# Patient Record
Sex: FEMALE | Race: OTHER | HISPANIC OR LATINO | Employment: UNEMPLOYED | URBAN - NONMETROPOLITAN AREA
[De-identification: names, ages, dates, MRNs, and addresses within clinical notes are randomized per-mention and may not be internally consistent; named-entity substitution may affect disease eponyms.]

---

## 2023-01-20 ENCOUNTER — OFFICE VISIT (OUTPATIENT)
Dept: FAMILY MEDICINE | Facility: CLINIC | Age: 23
End: 2023-01-20
Payer: MEDICAID

## 2023-01-20 VITALS
TEMPERATURE: 99 F | BODY MASS INDEX: 33.79 KG/M2 | HEART RATE: 87 BPM | DIASTOLIC BLOOD PRESSURE: 82 MMHG | OXYGEN SATURATION: 99 % | WEIGHT: 202.81 LBS | SYSTOLIC BLOOD PRESSURE: 122 MMHG | RESPIRATION RATE: 18 BRPM | HEIGHT: 65 IN

## 2023-01-20 DIAGNOSIS — F32.A DEPRESSION, UNSPECIFIED DEPRESSION TYPE: ICD-10-CM

## 2023-01-20 DIAGNOSIS — Z72.51 HIGH RISK HETEROSEXUAL BEHAVIOR: ICD-10-CM

## 2023-01-20 DIAGNOSIS — Z20.2 EXPOSURE TO STD: Primary | ICD-10-CM

## 2023-01-20 DIAGNOSIS — N89.8 VAGINAL ITCHING: ICD-10-CM

## 2023-01-20 LAB
B-HCG UR QL: NEGATIVE
BACTERIA VAG QL WET PREP: ABNORMAL /HPF
BILIRUB SERPL-MCNC: NEGATIVE MG/DL
BLOOD URINE, POC: ABNORMAL
CLUE CELLS VAG QL WET PREP: ABNORMAL /HPF
COLOR, POC UA: YELLOW
CTP QC/QA: YES
GLUCOSE UR QL STRIP: NEGATIVE
HIV 1+O+2 AB SERPL QL: NORMAL
KETONES UR QL STRIP: NEGATIVE
LEUKOCYTE ESTERASE URINE, POC: ABNORMAL
NITRITE, POC UA: NEGATIVE
PH, POC UA: 5.5
PROTEIN, POC: NEGATIVE
RBC #/AREA VAG WET PREP: ABNORMAL /HPF
SPECIFIC GRAVITY, POC UA: >1.03
SQUAMOUS EPITHELIALS WET WOUNT, GENITAL: ABNORMAL /HPF
SYPHILIS AB INTERPRETATION: NORMAL
T VAGINALIS VAG QL WET PREP: ABNORMAL /HPF
UROBILINOGEN, POC UA: 0.2
WBC CLUMPS WET MOUNT, GENITAL: ABNORMAL /HPF
WBC VAG QL WET PREP: ABNORMAL /HPF
YEAST VAG QL WET PREP: ABNORMAL /HPF

## 2023-01-20 PROCEDURE — 87591 CHLAMYDIA/GONORRHOEAE(GC), PCR: ICD-10-PCS | Mod: ,,, | Performed by: CLINICAL MEDICAL LABORATORY

## 2023-01-20 PROCEDURE — 99204 PR OFFICE/OUTPT VISIT, NEW, LEVL IV, 45-59 MIN: ICD-10-PCS | Mod: 25,,, | Performed by: NURSE PRACTITIONER

## 2023-01-20 PROCEDURE — 3008F PR BODY MASS INDEX (BMI) DOCUMENTED: ICD-10-PCS | Mod: CPTII,,, | Performed by: NURSE PRACTITIONER

## 2023-01-20 PROCEDURE — 87591 N.GONORRHOEAE DNA AMP PROB: CPT | Mod: ,,, | Performed by: CLINICAL MEDICAL LABORATORY

## 2023-01-20 PROCEDURE — 1159F MED LIST DOCD IN RCRD: CPT | Mod: CPTII,,, | Performed by: NURSE PRACTITIONER

## 2023-01-20 PROCEDURE — 87389 HIV-1 AG W/HIV-1&-2 AB AG IA: CPT | Mod: ,,, | Performed by: CLINICAL MEDICAL LABORATORY

## 2023-01-20 PROCEDURE — 3008F BODY MASS INDEX DOCD: CPT | Mod: CPTII,,, | Performed by: NURSE PRACTITIONER

## 2023-01-20 PROCEDURE — 3079F DIAST BP 80-89 MM HG: CPT | Mod: CPTII,,, | Performed by: NURSE PRACTITIONER

## 2023-01-20 PROCEDURE — 87491 CHLAMYDIA/GONORRHOEAE(GC), PCR: ICD-10-PCS | Mod: ,,, | Performed by: CLINICAL MEDICAL LABORATORY

## 2023-01-20 PROCEDURE — 96372 THER/PROPH/DIAG INJ SC/IM: CPT | Mod: ,,, | Performed by: NURSE PRACTITIONER

## 2023-01-20 PROCEDURE — 96372 PR INJECTION,THERAP/PROPH/DIAG2ST, IM OR SUBCUT: ICD-10-PCS | Mod: ,,, | Performed by: NURSE PRACTITIONER

## 2023-01-20 PROCEDURE — 3074F PR MOST RECENT SYSTOLIC BLOOD PRESSURE < 130 MM HG: ICD-10-PCS | Mod: CPTII,,, | Performed by: NURSE PRACTITIONER

## 2023-01-20 PROCEDURE — 86780 TREPONEMA PALLIDUM: CPT | Mod: ,,, | Performed by: CLINICAL MEDICAL LABORATORY

## 2023-01-20 PROCEDURE — 87491 CHLMYD TRACH DNA AMP PROBE: CPT | Mod: ,,, | Performed by: CLINICAL MEDICAL LABORATORY

## 2023-01-20 PROCEDURE — 87210 SMEAR WET MOUNT SALINE/INK: CPT | Mod: ,,, | Performed by: CLINICAL MEDICAL LABORATORY

## 2023-01-20 PROCEDURE — 86780 TREPONEMA PALLIDUM (SYPHILIS) ANTIBODY: ICD-10-PCS | Mod: ,,, | Performed by: CLINICAL MEDICAL LABORATORY

## 2023-01-20 PROCEDURE — 87210 WET PREP, GENITAL: ICD-10-PCS | Mod: ,,, | Performed by: CLINICAL MEDICAL LABORATORY

## 2023-01-20 PROCEDURE — 3079F PR MOST RECENT DIASTOLIC BLOOD PRESSURE 80-89 MM HG: ICD-10-PCS | Mod: CPTII,,, | Performed by: NURSE PRACTITIONER

## 2023-01-20 PROCEDURE — 81003 URINALYSIS AUTO W/O SCOPE: CPT | Mod: RHCUB | Performed by: NURSE PRACTITIONER

## 2023-01-20 PROCEDURE — 99204 OFFICE O/P NEW MOD 45 MIN: CPT | Mod: 25,,, | Performed by: NURSE PRACTITIONER

## 2023-01-20 PROCEDURE — 1159F PR MEDICATION LIST DOCUMENTED IN MEDICAL RECORD: ICD-10-PCS | Mod: CPTII,,, | Performed by: NURSE PRACTITIONER

## 2023-01-20 PROCEDURE — 81025 URINE PREGNANCY TEST: CPT | Mod: RHCUB | Performed by: NURSE PRACTITIONER

## 2023-01-20 PROCEDURE — 87389 HIV 1 / 2 ANTIBODY: ICD-10-PCS | Mod: ,,, | Performed by: CLINICAL MEDICAL LABORATORY

## 2023-01-20 PROCEDURE — 3074F SYST BP LT 130 MM HG: CPT | Mod: CPTII,,, | Performed by: NURSE PRACTITIONER

## 2023-01-20 RX ORDER — DOXYCYCLINE 100 MG/1
100 CAPSULE ORAL 2 TIMES DAILY
Qty: 20 CAPSULE | Refills: 0 | Status: SHIPPED | OUTPATIENT
Start: 2023-01-20

## 2023-01-20 RX ORDER — METRONIDAZOLE 500 MG/1
500 TABLET ORAL EVERY 12 HOURS
Qty: 28 TABLET | Refills: 8 | Status: SHIPPED | OUTPATIENT
Start: 2023-01-20

## 2023-01-20 RX ORDER — CEFTRIAXONE 500 MG/1
500 INJECTION, POWDER, FOR SOLUTION INTRAMUSCULAR; INTRAVENOUS
Status: COMPLETED | OUTPATIENT
Start: 2023-01-20 | End: 2023-01-20

## 2023-01-20 RX ADMIN — CEFTRIAXONE 500 MG: 500 INJECTION, POWDER, FOR SOLUTION INTRAMUSCULAR; INTRAVENOUS at 10:01

## 2023-01-20 NOTE — PROGRESS NOTES
Temi Scott NP   [facility]  [address] [address] [facility] [address]  [place]     PATIENT NAME: Pretty Spivey  : 2000  DATE: 23  MRN: [id number]      Billing Provider: Temi Scott NP  Level of Service: CO OFFICE/OUTPT VISIT, BRYSON ORTIZ IV, 45-59 MIN  Patient PCP Information       Provider PCP Type    Temi Scott NP General            Reason for Visit / Chief Complaint: Depression (X 7 years- Was seeing therapist in [place] about 2 years ago) and Exposure to STD (Had sex 6 days ago. Boyfriend has green/ yellow penile drainage that started 3 days ago. /Patient has experienced some vaginal itching- started last night. )       Update PCP  Update Chief Complaint         History of Present Illness / Problem Focused Workflow     Pretty Spivey presents to the clinic her for exposure to std, had sex hanh 6 days ago, boyfriend has green penile discharge that started 3 days ago, has had some vag itching that started last night, also c/o depression, will schedule hanh for eval.       Review of Systems     Review of Systems   Constitutional:  Negative for chills, fatigue and fever.   HENT:  Negative for nasal congestion, ear pain, facial swelling, hearing loss, mouth dryness, mouth sores, postnasal drip, rhinorrhea, sinus pressure/congestion and goiter.    Eyes:  Negative for discharge and itching.   Respiratory:  Negative for cough, shortness of breath and wheezing.    Cardiovascular:  Negative for chest pain and leg swelling.   Gastrointestinal:  Negative for abdominal pain, change in bowel habit and change in bowel habit.   Genitourinary:  Negative for difficulty urinating, dysuria, enuresis, frequency, hematuria and urgency.        Vaginal itching   Neurological:  Negative for dizziness, vertigo, syncope, weakness and headaches.   Psychiatric/Behavioral:  Negative for decreased concentration.    All other systems reviewed and are negative.     Medical / Social / Family History   No past medical history on  "file.    No past surgical history on file.    Social History  MsShannan  reports that she has been smoking cigarettes. She has never used smokeless tobacco. She reports that she does not use drugs.    Family History  Ms.'s Family history is unknown by patient.    Medications and Allergies     Medications  No outpatient medications have been marked as taking for the 1/20/23 encounter (Office Visit) with Temi Scott NP.     Current Facility-Administered Medications for the 1/20/23 encounter (Office Visit) with Temi Scott NP   Medication Dose Route Frequency Provider Last Rate Last Admin    [COMPLETED] cefTRIAXone injection 500 mg  500 mg Intramuscular 1 time in Clinic/HOD Temi Scott NP   500 mg at 01/20/23 1029       Allergies  Review of patient's allergies indicates:  No Known Allergies    Physical Examination     Vitals:    01/20/23 0918   BP: 122/82   BP Location: Right arm   Patient Position: Sitting   Pulse: 87   Resp: 18   Temp: 99 °F (37.2 °C)   TempSrc: Oral   SpO2: 99%   Weight: 92 kg (202 lb 12.8 oz)   Height: 5' 5" (1.651 m)      Physical Exam  Vitals and nursing note reviewed.   Constitutional:       Appearance: Normal appearance.   HENT:      Head: Normocephalic.      Right Ear: Tympanic membrane, ear canal and external ear normal.      Left Ear: Tympanic membrane, ear canal and external ear normal.      Nose: Nose normal.      Mouth/Throat:      Mouth: Mucous membranes are moist.      Pharynx: Oropharynx is clear.   Eyes:      Extraocular Movements: Extraocular movements intact.      Conjunctiva/sclera: Conjunctivae normal.      Pupils: Pupils are equal, round, and reactive to light.   Cardiovascular:      Rate and Rhythm: Normal rate and regular rhythm.      Pulses: Normal pulses.      Heart sounds: Normal heart sounds.   Pulmonary:      Effort: Pulmonary effort is normal.      Breath sounds: Normal breath sounds.   Abdominal:      General: Bowel sounds are normal.      Palpations: Abdomen is " soft.   Genitourinary:     Comments: Deferred  Pt obtained vag swab for wet prep  Musculoskeletal:         General: Normal range of motion.   Skin:     General: Skin is warm and dry.      Capillary Refill: Capillary refill takes less than 2 seconds.   Neurological:      General: No focal deficit present.      Mental Status: She is alert and oriented to person, place, and time.   Psychiatric:         Mood and Affect: Mood normal.         Behavior: Behavior normal.        Assessment and Plan (including Health Maintenance)      Problem List  Smart Sets  Document Outside HM   :    Plan: make hanh for eval of depression, stressed to use protection whilehaving sex, meds as ordered, return to clinic as scheduled    Exposure to STD    Depression, unspecified depression type  -     Ambulatory referral/consult to Psychiatry; Future; Expected date: 01/27/2023    Vaginal itching  -     POCT URINALYSIS W/O SCOPE  -     POCT urine pregnancy  -     Chlamydia/GC, PCR; Future; Expected date: 01/20/2023  -     Wet Prep, Genital; Future; Expected date: 01/20/2023    High risk heterosexual behavior  -     Ambulatory referral/consult to Psychiatry; Future; Expected date: 01/27/2023  -     POCT URINALYSIS W/O SCOPE  -     POCT urine pregnancy  -     Chlamydia/GC, PCR; Future; Expected date: 01/20/2023  -     Wet Prep, Genital; Future; Expected date: 01/20/2023  -     HIV 1/2 Ag/Ab (4th Gen); Future; Expected date: 01/20/2023  -     Syphilis Antibody with reflex to RPR; Future; Expected date: 01/20/2023  -     cefTRIAXone injection 500 mg  -     doxycycline (MONODOX) 100 MG capsule; Take 1 capsule (100 mg total) by mouth 2 (two) times daily.  Dispense: 20 capsule; Refill: 0  -     metroNIDAZOLE (FLAGYL) 500 MG tablet; Take 1 tablet (500 mg total) by mouth every 12 (twelve) hours.  Dispense: 28 tablet; Refill: 8            Health Maintenance Due   Topic Date Due    Hepatitis C Screening  Never done    Lipid Panel  Never done    COVID-19  Vaccine (1) Never done    Pneumococcal Vaccines (Age 0-64) (1 - PCV) Never done    HPV Vaccines (1 - 2-dose series) Never done    HIV Screening  Never done    TETANUS VACCINE  Never done       Problem List Items Addressed This Visit          Psychiatric    Depression    Relevant Orders    Ambulatory referral/consult to Psychiatry       Renal/    Vaginal itching    Relevant Orders    POCT URINALYSIS W/O SCOPE (Completed)    POCT urine pregnancy (Completed)    Chlamydia/GC, PCR    Wet Prep, Genital       ID    Exposure to STD - Primary       Other    High risk heterosexual behavior    Relevant Medications    cefTRIAXone injection 500 mg (Completed)    doxycycline (MONODOX) 100 MG capsule    metroNIDAZOLE (FLAGYL) 500 MG tablet    Other Relevant Orders    Ambulatory referral/consult to Psychiatry    POCT URINALYSIS W/O SCOPE (Completed)    POCT urine pregnancy (Completed)    Chlamydia/GC, PCR    Wet Prep, Genital    HIV 1/2 Ag/Ab (4th Gen)    Syphilis Antibody with reflex to RPR         The patient has no Health Maintenance topics of status Not Due    Procedures     No future appointments.     Follow up if symptoms worsen or fail to improve.       Signature:  Temi Scott NP    Date of encounter: 1/20/23

## 2023-01-21 LAB
CHLAMYDIA BY PCR: POSITIVE
N. GONORRHOEAE (GC) BY PCR: POSITIVE